# Patient Record
Sex: FEMALE | Race: AMERICAN INDIAN OR ALASKA NATIVE
[De-identification: names, ages, dates, MRNs, and addresses within clinical notes are randomized per-mention and may not be internally consistent; named-entity substitution may affect disease eponyms.]

---

## 2018-01-27 ENCOUNTER — HOSPITAL ENCOUNTER (EMERGENCY)
Dept: HOSPITAL 42 - ED | Age: 23
Discharge: HOME | End: 2018-01-27
Payer: COMMERCIAL

## 2018-01-27 VITALS — HEART RATE: 68 BPM | DIASTOLIC BLOOD PRESSURE: 76 MMHG | SYSTOLIC BLOOD PRESSURE: 118 MMHG | RESPIRATION RATE: 18 BRPM

## 2018-01-27 VITALS — BODY MASS INDEX: 24.2 KG/M2

## 2018-01-27 VITALS — TEMPERATURE: 99.2 F | OXYGEN SATURATION: 100 %

## 2018-01-27 DIAGNOSIS — A59.9: ICD-10-CM

## 2018-01-27 DIAGNOSIS — N83.202: ICD-10-CM

## 2018-01-27 DIAGNOSIS — N83.201: ICD-10-CM

## 2018-01-27 DIAGNOSIS — N94.6: ICD-10-CM

## 2018-01-27 DIAGNOSIS — N39.0: Primary | ICD-10-CM

## 2018-01-27 LAB
ALBUMIN SERPL-MCNC: 3.9 G/DL (ref 3–4.8)
ALBUMIN/GLOB SERPL: 1.4 {RATIO} (ref 1.1–1.8)
ALT SERPL-CCNC: 25 U/L (ref 7–56)
AMORPH SED URNS QL MICRO: (no result)
APPEARANCE UR: CLEAR
APTT BLD: 34.9 SECONDS (ref 25.1–36.5)
AST SERPL-CCNC: 17 U/L (ref 14–36)
BACTERIA #/AREA URNS HPF: (no result) /[HPF]
BASOPHILS # BLD AUTO: 0.07 K/MM3 (ref 0–2)
BASOPHILS NFR BLD: 1.3 % (ref 0–3)
BILIRUB UR-MCNC: NEGATIVE MG/DL
BUN SERPL-MCNC: 12 MG/DL (ref 7–21)
CALCIUM SERPL-MCNC: 9 MG/DL (ref 8.4–10.5)
COLOR UR: YELLOW
EOSINOPHIL # BLD: 0.3 10*3/UL (ref 0–0.7)
EOSINOPHIL NFR BLD: 4.7 % (ref 1.5–5)
ERYTHROCYTE [DISTWIDTH] IN BLOOD BY AUTOMATED COUNT: 13.4 % (ref 11.5–14.5)
GFR NON-AFRICAN AMERICAN: > 60
GLUCOSE UR STRIP-MCNC: NEGATIVE MG/DL
GRANULOCYTES # BLD: 2.86 10*3/UL (ref 1.4–6.5)
GRANULOCYTES NFR BLD: 51.4 % (ref 50–68)
HCG,QUALITATIVE URINE: NEGATIVE
HGB BLD-MCNC: 12.2 G/DL (ref 12–16)
INR PPP: 1.19 (ref 0.93–1.08)
LEUKOCYTE ESTERASE UR-ACNC: (no result) LEU/UL
LYMPHOCYTES # BLD: 2 10*3/UL (ref 1.2–3.4)
LYMPHOCYTES NFR BLD AUTO: 36.8 % (ref 22–35)
MCH RBC QN AUTO: 28.8 PG (ref 25–35)
MCHC RBC AUTO-ENTMCNC: 33 G/DL (ref 31–37)
MCV RBC AUTO: 87.3 FL (ref 80–105)
MONOCYTES # BLD AUTO: 0.3 10*3/UL (ref 0.1–0.6)
MONOCYTES NFR BLD: 5.8 % (ref 1–6)
PH UR STRIP: 6 [PH] (ref 4.7–8)
PLATELET # BLD: 228 10^3/UL (ref 120–450)
PMV BLD AUTO: 11.6 FL (ref 7–11)
PROT UR STRIP-MCNC: NEGATIVE MG/DL
PROTHROMBIN TIME: 13.6 SECONDS (ref 9.4–12.5)
RBC # BLD AUTO: 4.24 10^6/UL (ref 3.5–6.1)
RBC # UR STRIP: NEGATIVE /UL
RBC #/AREA URNS HPF: (no result) /HPF (ref 0–2)
SP GR UR STRIP: 1.02 (ref 1–1.03)
URINE NITRATE: POSITIVE
UROBILINOGEN UR STRIP-ACNC: 1 E.U./DL
WBC # BLD AUTO: 5.6 10^3/UL (ref 4.5–11)

## 2018-01-27 NOTE — US
EXAM:

  US Pelvis Complete, Transabdominal



CLINICAL HISTORY:

  22 years old, female; Pain; Pelvic pain; Additional info: Lower abdominal 

pain with bleeding, neg preg



TECHNIQUE:

  Real-time transabdominal pelvic ultrasound (complete) with image 

documentation.



COMPARISON:

  CT - ABD   PELVIS W/O PO OR IV CONT 2017-01-04 22:45



FINDINGS:

  Uterus/cervix:  Uterus measures 7.6 x 4.2 x 6.7 cm in size.  No myometrial 

mass.  

  Endometrium:  0.8 cm in thickness.

  Right ovary:  3.4 x 2.7 x 2.1 cm in size.  1.6 x 1.4 x 1.6 cm anechoic 

lesion.  Small follicles.  Normal flow.

  Left ovary:  3.1 x 2.2 x 3.0 cm in size.  1.3 x 1.6 x 1.3 cm anechoic lesion. 

 Small follicles.  Normal flow.

  Free fluid:  No significant free fluid.

  Bladder:  Unremarkable as visualized.

 

IMPRESSION:     

1.  RIGHT ovarian cyst.

2.  LEFT ovarian cyst.



_______________________________________________



EXAM:

  US Pelvis, Transvaginal



CLINICAL HISTORY:

  22 years old, female; Pain; Pelvic pain; Additional info: Lower abdominal 

pain with bleeding, neg preg



TECHNIQUE:

  Real-time transvaginal pelvic ultrasound (complete) with image documentation. 

 Transvaginal imaging was used for better evaluation of the endometrium and 

adnexa.



COMPARISON:

  CT - ABD   PELVIS W/O PO OR IV CONT 2017-01-04 22:45



FINDINGS:

  Uterus/cervix:  Uterus measures 7.6 x 4.2 x 6.7 cm in size.  No myometrial 

mass.

  Endometrium:  0.8 cm in thickness.

  Right ovary:  3.4 x 2.7 x 2.1 cm in size.  1.6 x 1.4 x 1.6 cm anechoic 

lesion.  Small follicles.  Normal flow.

  Left ovary:  3.1 x 2.2 x 3.0 cm in size.  1.3 x 1.6 x 1.3 cm anechoic lesion. 

 Small follicles.  Normal flow.

  Free fluid:  No significant free fluid.

  Bladder:  Empty bladder which cannot be evaluated with this probe.



IMPRESSION:     

1.  RIGHT ovarian cyst.

2.  LEFT ovarian cyst.

## 2018-01-27 NOTE — ED PDOC
Arrival/HPI





- General


Chief Complaint: Abdominal Pain


Time Seen by Provider: 01/27/18 15:52


Historian: Patient





- History of Present Illness


Narrative History of Present Illness (Text): 





01/27/18 16:13


Patient is a 21 yo female, LMP 6 days ago, presents to Emergency Department 

complaining of bilateral lower abdominal pain, right greater than left, for 

past 2-3 days. Patient also reportedly has had vaginal spotting yesterday and 

today, with "clot of tissue" when she urinated yesterday. Denies dysuria or 

frequency. Denies nausea vomiting or diarrhea. States that her last menstrual 

period ended 6 days ago and was heavier than usual with larger clots than 

usual. She states that she is sexually active but states she believes that she 

is not pregnant. Denies back pain. Denies chest pain or shortness of breath.





Past Medical History





- Past History


Past History: No Previous





- Infectious Disease


Hx of Infectious Diseases: None





- Tetanus Immunization


Tetanus Immunization: Up to Date





- Cardiac


Hx Cardiac Disorders: No





- Pulmonary


Hx Respiratory Disorders: No





- Neurological


Hx Neurological Disorder: Yes


Hx Migraine: Yes





- HEENT


Hx HEENT Disorder: No





- Renal


Hx Renal Disorder: No





- Endocrine/Metabolic


Hx Endocrine Disorders: No





- Hematological/Oncological


Hx Blood Disorders: No





- Integumentary


Hx Dermatological Disorder: No





- Musculoskeletal/Rheumatological


Hx Musculoskeletal Disorders: No





- Gastrointestinal


Hx Gastrointestinal Disorders: No





- Genitourinary/Gynecological


Hx Genitourinary Disorders: No





- Psychiatric


Hx Psychophysiologic Disorder: No


Hx Substance Use: No





Family/Social History


Family/Social History: Unknown Family HX


Smoking Status: Never Smoked


Hx Alcohol Use: Yes


Hx Substance Use: No





Allergies/Home Meds


Allergies/Adverse Reactions: 


Allergies





No Known Allergies Allergy (Verified 08/29/16 23:13)


 











Review of Systems





- Review of Systems


Constitutional: absent: Fevers


Eyes: absent: Vision Changes


ENT: absent: Hearing Changes


Respiratory: absent: SOB


Cardiovascular: absent: Chest Pain


Gastrointestinal: Abdominal Pain.  absent: Constipation, Diarrhea, Hematochezia

, Hematemesis, Anorexia, Food Intolerance


Genitourinary Female: Vaginal Bleeding.  absent: Dysuria, Frequency, Hematuria, 

Urine Output Changes





Physical Exam


Vital Signs Reviewed: Yes


Vital Signs











  Temp Pulse Resp BP Pulse Ox


 


 01/27/18 20:24   68  18  118/76  100


 


 01/27/18 14:44  99.2 F  73  16  106/73  100











Temperature: Afebrile


Appearance: Positive for: Non-Toxic


Pain Distress: Mild


Mental Status: Positive for: Alert and Oriented X 3





- Systems Exam


Head: Present: Atraumatic


Pupils: Present: PERRL


Mouth: Present: Moist Mucous Membranes


Pharnyx: No: ERYTHEMA


Nose (Internal): Present: Normal Inspection


Neck: Present: Normal Range of Motion.  No: Meningeal Signs


Respiratory/Chest: Present: Clear to Auscultation.  No: Respiratory Distress


Cardiovascular: Present: Regular Rate and Rhythm


Abdomen: Present: Tenderness (mild suprapubic, mild rlq pain).  No: Peritoneal 

Signs, Rebound, Guarding, McBurney's Point Tender


Rectal: No: Gross Blood


Genitourinary/Pelvic Exam: Present: Other (pelvic exam performed with MATTEO Neves chaperone, no active bleeding, no external lesions or clots or erythema or 

edema, no adnexal masses palpated)


Back: No: CVA Tenderness, Midline Tenderness


Upper Extremity: Present: NORMAL PULSES.  No: Cyanosis, Edema


Lower Extremity: Present: NORMAL PULSES, Neurovascularly Intact


Skin: Present: Warm


Psychiatric: Present: Alert





Medical Decision Making


ED Course and Treatment: 





01/27/18 19:01


Patient reports heavier menstrual period last week. Bleeding stopped than 

spotting started yesterday. Currently mild lower abdominal pain. Denies urinary 

symptoms. CBC obtained due to hx of heavier bleeding, hgb unremarkable. Patient 

not tachycardic or hypotensive. No fever, no vomiting or diarrhea.


UA pregnancy negative. 





Ultrasound ordered due to lower abdominal pain with bleeding.


Case endorsed to Dr. Ratliff pending urinalysis evaluation and ultrasound.





- Lab Interpretations


Microbiology Results: 


Microbiology Results





01/27/18 21:00   Urine,Clean Catch   Urine Culture - Final


                            Escherichia Coli








Lab Results: 








 01/27/18 16:16 





 01/27/18 16:16 





 Lab Results





01/27/18 19:52: Urine Color Yellow, Urine Appearance Clear, Urine pH 6.0, Ur 

Specific Gravity 1.025, Urine Protein Negative, Urine Glucose (UA) Negative, 

Urine Ketones Trace H, Urine Blood Negative, Urine Nitrate Positive H, Urine 

Bilirubin Negative, Urine Urobilinogen 1.0 H, Ur Leukocyte Esterase Trace H, 

Urine RBC 0 - 2, Urine WBC 2 - 5, Ur Epithelial Cells 6 - 8, Amorphous Sediment 

Few, Urine Bacteria Many, Urine Other Trichomonas, Urine HCG, Qual Negative


01/27/18 16:16: Beta HCG, Quant < 2.39


01/27/18 16:16: Sodium 138, Potassium 3.7, Chloride 106, Carbon Dioxide 24, 

Anion Gap 11, BUN 12, Creatinine 0.6 L, Est GFR ( Amer) > 60, Est GFR (

Non-Af Amer) > 60, Random Glucose 81, Calcium 9.0, Total Bilirubin 0.4, AST 17, 

ALT 25, Alkaline Phosphatase 58, Total Protein 6.6, Albumin 3.9, Globulin 2.8, 

Albumin/Globulin Ratio 1.4


01/27/18 16:16: PT 13.6 H, INR 1.19 H, APTT 34.9


01/27/18 16:16: WBC 5.6  D, RBC 4.24, Hgb 12.2, Hct 37.0, MCV 87.3, MCH 28.8, 

MCHC 33.0, RDW 13.4, Plt Count 228, MPV 11.6 H, Gran % 51.4, Lymph % (Auto) 

36.8 H, Mono % (Auto) 5.8, Eos % (Auto) 4.7, Baso % (Auto) 1.3, Gran # 2.86, 

Lymph # 2.0, Mono # 0.3, Eos # 0.3, Baso # 0.07











- RAD Interpretation


Radiology Orders: 








01/27/18 16:08


TRANSVAGINAL [US] Stat 














- Medication Orders


Current Medication Orders: 











Discontinued Medications





Cephalexin Monohydrate (Keflex)  500 mg PO STAT ONE


   PRN Reason: Protocol


   Stop: 01/27/18 21:16


   Last Admin: 01/27/18 21:17  Dose: 500 mg





Metronidazole (Flagyl)  2,000 mg PO STAT STA


   PRN Reason: Protocol


   Stop: 01/27/18 20:58


   Last Admin: 01/27/18 21:16  Dose: 2,000 mg











Disposition/Present on Arrival





- Present on Arrival


Any Indicators Present on Arrival: No


History of DVT/PE: No


History of Uncontrolled Diabetes: No


Urinary Catheter: No


History of Decub. Ulcer: No


History Surgical Site Infection Following: None





- Disposition


Have Diagnosis and Disposition been Completed?: Yes


Diagnosis: 


 Ovarian cyst, Dysmenorrhea, UTI (urinary tract infection), Trichomoniasis





Disposition: HOME/ ROUTINE


Disposition Time: 19:00


Patient Plan: Observation


Condition: FAIR


Discharge Instructions (ExitCare):  Dysmenorrhea (ED), Ovarian Cyst (ED), 

Trichomoniasis (ED), Urinary Tract Infection in Women (ED)


Additional Instructions: 


Drink plenty of liquids/take meds as prescribed/advil as directed/follow up 

with your gynecologist this week


Prescriptions: 


Cephalexin [cephalexin] 500 mg PO BID #14 cap


Forms:  [employer] (English)

## 2018-01-27 NOTE — ED PDOC
Physical Exam


Vital Signs Reviewed: Yes


Vital Signs











  Temp Pulse Resp BP Pulse Ox


 


 01/27/18 20:24   68  18  118/76  100


 


 01/27/18 14:44  99.2 F  73  16  106/73  100











Temperature: Afebrile


Blood Pressure: Normal


Pulse: Regular


Respiratory Rate: Normal


Appearance: Positive for: Well-Appearing, Non-Toxic, Comfortable


Pain Distress: None


Mental Status: Positive for: Alert and Oriented X 3





Medical Decision Making


ED Course and Treatment: 





01/27/18 19:21: Patient endorsed to me by Dr. Hanks. Pending Transvaginal 

Ultrasound results. Will re-evaluate and disposition.








- Lab Interpretations


Lab Results: 








 01/27/18 16:16 





 01/27/18 16:16 





 Lab Results





01/27/18 19:52: Urine Color Yellow, Urine Appearance Clear, Urine pH 6.0, Ur 

Specific Gravity 1.025, Urine Protein Negative, Urine Glucose (UA) Negative, 

Urine Ketones Trace H, Urine Blood Negative, Urine Nitrate Positive H, Urine 

Bilirubin Negative, Urine Urobilinogen 1.0 H, Ur Leukocyte Esterase Trace H, 

Urine RBC 0 - 2, Urine WBC 2 - 5, Ur Epithelial Cells 6 - 8, Amorphous Sediment 

Few, Urine Bacteria Many, Urine Other Trichomonas, Urine HCG, Qual Negative


01/27/18 16:16: Beta HCG, Quant < 2.39


01/27/18 16:16: Sodium 138, Potassium 3.7, Chloride 106, Carbon Dioxide 24, 

Anion Gap 11, BUN 12, Creatinine 0.6 L, Est GFR ( Amer) > 60, Est GFR (

Non-Af Amer) > 60, Random Glucose 81, Calcium 9.0, Total Bilirubin 0.4, AST 17, 

ALT 25, Alkaline Phosphatase 58, Total Protein 6.6, Albumin 3.9, Globulin 2.8, 

Albumin/Globulin Ratio 1.4


01/27/18 16:16: PT 13.6 H, INR 1.19 H, APTT 34.9


01/27/18 16:16: WBC 5.6  D, RBC 4.24, Hgb 12.2, Hct 37.0, MCV 87.3, MCH 28.8, 

MCHC 33.0, RDW 13.4, Plt Count 228, MPV 11.6 H, Gran % 51.4, Lymph % (Auto) 

36.8 H, Mono % (Auto) 5.8, Eos % (Auto) 4.7, Baso % (Auto) 1.3, Gran # 2.86, 

Lymph # 2.0, Mono # 0.3, Eos # 0.3, Baso # 0.07











- RAD Interpretation


Radiology Orders: 








01/27/18 16:08


TRANSVAGINAL [US] Stat 














- Scribe Statement


The provider has reviewed the documentation as recorded by the Scribe


Lesly Suarez 





Provider Scribe Attestation:


All medical record entries made by the Scribe were at my direction and 

personally dictated by me. I have reviewed the chart and agree that the record 

accurately reflects my personal performance of the history, physical exam, 

medical decision making, and the department course for this patient. I have 

also personally directed, reviewed, and agree with the discharge instructions 

and disposition.








Disposition/Present on Arrival





- Present on Arrival


Any Indicators Present on Arrival: No


History of DVT/PE: No


History of Uncontrolled Diabetes: No


Urinary Catheter: No


History of Decub. Ulcer: No


History Surgical Site Infection Following: None





- Disposition


Have Diagnosis and Disposition been Completed?: Yes


Diagnosis: 


 Ovarian cyst, Dysmenorrhea, UTI (urinary tract infection), Trichomoniasis





Disposition: HOME/ ROUTINE


Disposition Time: 20:54


Patient Plan: Discharge


Patient Problems: 


 Current Active Problems











Problem Status Onset


 


Dysmenorrhea Acute  


 


Ovarian cyst Acute  











Condition: FAIR


Discharge Instructions (ExitCare):  Ovarian Cyst (ED), Dysmenorrhea (ED), 

Urinary Tract Infection in Women (ED), Trichomoniasis (ED)


Additional Instructions: 


Drink plenty of liquids/take meds as prescribed/advil as directed/follow up 

with your gynecologist this week


Prescriptions: 


Cephalexin [cephalexin] 500 mg PO BID #14 cap


Forms:  [employer] (English)

## 2018-04-26 ENCOUNTER — HOSPITAL ENCOUNTER (EMERGENCY)
Dept: HOSPITAL 31 - C.ER | Age: 23
Discharge: HOME | End: 2018-04-26
Payer: SELF-PAY

## 2018-04-26 VITALS — SYSTOLIC BLOOD PRESSURE: 120 MMHG | DIASTOLIC BLOOD PRESSURE: 65 MMHG | OXYGEN SATURATION: 99 % | HEART RATE: 76 BPM

## 2018-04-26 VITALS — BODY MASS INDEX: 24.2 KG/M2

## 2018-04-26 VITALS — TEMPERATURE: 98.9 F | RESPIRATION RATE: 20 BRPM

## 2018-04-26 DIAGNOSIS — R10.9: ICD-10-CM

## 2018-04-26 DIAGNOSIS — N83.201: Primary | ICD-10-CM

## 2018-04-26 LAB
ALBUMIN SERPL-MCNC: 3.9 [, G/DL] (ref 3.5–5)
ALBUMIN/GLOB SERPL: 1.3 [,] (ref 1–2.1)
ALT SERPL-CCNC: 14 [, U/L] (ref 9–52)
AST SERPL-CCNC: 14 [, U/L] (ref 14–36)
BACTERIA #/AREA URNS HPF: (no result) [,]
BASOPHILS # BLD AUTO: 0.1 [, K/UL] (ref 0–0.2)
BASOPHILS NFR BLD: 1 [, %] (ref 0–2)
BILIRUB UR-MCNC: NEGATIVE [,]
BUN SERPL-MCNC: 15 [, MG/DL] (ref 7–17)
CALCIUM SERPL-MCNC: 8.9 [, MG/DL] (ref 8.6–10.4)
EOSINOPHIL # BLD AUTO: 0.5 [, K/UL] (ref 0–0.7)
EOSINOPHIL NFR BLD: 5.6 [, %] (ref 0–4)
ERYTHROCYTE [DISTWIDTH] IN BLOOD BY AUTOMATED COUNT: 13.9 [, %] (ref 11.5–14.5)
GFR NON-AFRICAN AMERICAN: > 60 [,]
GLUCOSE UR STRIP-MCNC: NORMAL [, MG/DL]
HCG,QUALITATIVE URINE: NEGATIVE [,]
HGB BLD-MCNC: 12.2 [, G/DL] (ref 11–16)
LEUKOCYTE ESTERASE UR-ACNC: (no result) [, LEU/UL]
LIPASE: 94 [, U/L] (ref 23–300)
LYMPHOCYTES # BLD AUTO: 2.8 [, K/UL] (ref 1–4.3)
LYMPHOCYTES NFR BLD AUTO: 33.8 [, %] (ref 20–40)
MCH RBC QN AUTO: 29.2 [, PG] (ref 27–31)
MCHC RBC AUTO-ENTMCNC: 33.7 [, G/DL] (ref 33–37)
MCV RBC AUTO: 86.5 [, FL] (ref 81–99)
MONOCYTES # BLD: 0.7 [, K/UL] (ref 0–0.8)
MONOCYTES NFR BLD: 8.2 [, %] (ref 0–10)
NEUTROPHILS # BLD: 4.3 [, K/UL] (ref 1.8–7)
NEUTROPHILS NFR BLD AUTO: 51.4 [, %] (ref 50–75)
NRBC BLD AUTO-RTO: 0 [, %] (ref 0–2)
PH UR STRIP: 7 [,] (ref 5–8)
PLATELET # BLD: 205 [, K/UL] (ref 130–400)
PMV BLD AUTO: 9.6 [, FL] (ref 7.2–11.7)
PROT UR STRIP-MCNC: NEGATIVE [, MG/DL]
RBC # BLD AUTO: 4.2 [, MIL/UL] (ref 3.8–5.2)
RBC # UR STRIP: NEGATIVE [,]
SP GR UR STRIP: 1.03 [,] (ref 1–1.03)
SQUAMOUS EPITHIAL: 6 [, /HPF] (ref 0–5)
UROBILINOGEN UR-MCNC: 4 [, MG/DL] (ref 0.2–1)
WBC # BLD AUTO: 8.4 [, K/UL] (ref 4.8–10.8)

## 2018-04-26 PROCEDURE — 99284 EMERGENCY DEPT VISIT MOD MDM: CPT

## 2018-04-26 PROCEDURE — 74177 CT ABD & PELVIS W/CONTRAST: CPT

## 2018-04-26 PROCEDURE — 84702 CHORIONIC GONADOTROPIN TEST: CPT

## 2018-04-26 PROCEDURE — 81001 URINALYSIS AUTO W/SCOPE: CPT

## 2018-04-26 PROCEDURE — 85025 COMPLETE CBC W/AUTO DIFF WBC: CPT

## 2018-04-26 PROCEDURE — 83690 ASSAY OF LIPASE: CPT

## 2018-04-26 PROCEDURE — 80053 COMPREHEN METABOLIC PANEL: CPT

## 2018-04-26 PROCEDURE — 96360 HYDRATION IV INFUSION INIT: CPT

## 2018-04-26 PROCEDURE — 84703 CHORIONIC GONADOTROPIN ASSAY: CPT

## 2018-04-26 NOTE — CT
EXAM:

  CT Abdomen and Pelvis With Intravenous Contrast



EXAM DATE/TIME:

  4/26/2018 7:49 PM



CLINICAL HISTORY:

  22 years old, female; Pain and signs and symptoms; Bloating; Abdominal pain; 

Generalized; Additional info: Abdominla pain and distension



TECHNIQUE:

  Axial computed tomography images of the abdomen and pelvis with intravenous 

contrast.  All CT scans at this facility use one or more dose reduction 

techniques, viz.: automated exposure control; ma/kV adjustment per patient size 

(including targeted exams where dose is matched to indication; i.e. head); or 

iterative reconstruction technique.

  Coronal and sagittal reformatted images were created and reviewed.



CONTRAST:

  100 mL of visipaque 320 administered intravenously.



COMPARISON:

  No relevant prior studies available.



FINDINGS:

  LUNG BASES:  No significant abnormality seen.



 ABDOMEN:

  LIVER:  No acute abnormality of the liver identified.

  GALLBLADDER AND BILE DUCTS:  No CT evidence of acute cholecystitis.  No 

evidence of significant biliary ductal dilatation.

  PANCREAS:  No CT evidence of acute pancreatitis.

  SPLEEN:  No acute abnormality of the spleen identified.

  ADRENALS:  No acute abnormality of the adrenal glands identified.

  KIDNEYS AND URETERS:  No acute abnormality of the kidneys identified. 

  STOMACH AND BOWEL:  No acute abnormality of the stomach, small bowel or colon 

identified. No evidence of bowel obstruction.



 PELVIS:

  APPENDIX:  Appendix is seen, and is within normal limits in appearance.

  BLADDER:  No acute abnormality of the bladder identified.

  REPRODUCTIVE:  Multiple prominent vessels in the adnexal regions bilaterally. 

This is a a nonspecific finding, but can be seen with pelvic congestion 

syndrome. Recommend clinical correlation.  Small 1.4 cm lesion with a thin, 

enhancing and collapsed soft tissue rim in the right ovary.  This has an 

appearance suggestive of a recently ruptured/involuting ovarian cyst, such as a 

corpus luteal cyst.  Followup pelvic ultrasound as clinically indicated.  No 

evidence of large left adnexal cystic masses.  No acute abnormality of the 

uterus identified.



 ABDOMEN and PELVIS:

  INTRAPERITONEAL SPACE:  Small amount of free fluid in the pelvis. This is 

most likely physiologic in nature.  No evidence of free air.

  BONES/JOINTS:  No acute fractures or other acute bony abnormality noted.

  SOFT TISSUES:  No acute abnormality of the visualized soft tissues is seen.

  VASCULATURE:   No evidence of aortic dissection. 

  LYMPH NODES:  No evidence of diffuse lymphadenopathy.



IMPRESSION:     

- No evidence of significant acute process.

- Small amount of pelvic free fluid, most likely physiologic in nature.

- See above for remaining findings.

## 2018-04-26 NOTE — C.PDOC
History Of Present Illness


Patient presents to the ER with a complaint of abdominal distention and diffuse 

cramping pain since yesterday. Denies fever, chills, nausea, or vomiting.


Time Seen by Provider: 04/26/18 19:12


Chief Complaint (Nursing): Abdominal Pain


History Per: Patient


History/Exam Limitations: no limitations


Onset/Duration Of Symptoms: Days


Current Symptoms Are (Timing): Still Present


Severity: Moderate


Pain Scale Rating Of: 4


Location Of Pain/Discomfort: Diffuse


Radiation Of Pain To:: None


Quality Of Discomfort: Cramping


Associated Symptoms: denies: Fever, Chills, Nausea, Vomiting


Exacerbating Factors: None


Alleviating Factors: None


Recent travel outside of the [place]: No


Abnormal Vaginal Bleeding: No





Past Medical History


Reviewed: Historical Data, Nursing Documentation, Vital Signs


Vital Signs: 


 Last Vital Signs











Temp  98.9 F   04/26/18 18:51


 


Pulse  78   04/26/18 18:51


 


Resp  20   04/26/18 18:51


 


BP  117/70   04/26/18 18:51


 


Pulse Ox  100   04/26/18 19:51














- Medical History


PMH: Migraine


Family History: States: No Known Family Hx





- Social History


Hx Alcohol Use: Yes


Hx Substance Use: No





- Immunization History


Hx Tetanus Toxoid Vaccination: No


Hx Influenza Vaccination: No


Hx Pneumococcal Vaccination: No





Review Of Systems


Constitutional: Negative for: Fever, Chills


Respiratory: Negative for: Cough


Gastrointestinal: Positive for: Abdominal Pain, Other (Abdominal distention).  

Negative for: Nausea, Vomiting





Physical Exam





- Physical Exam


Appears: Non-toxic


Skin: Warm, Dry


Head: Normacephalic


Oral Mucosa: Moist


Chest: Symmetrical, No Tenderness


Cardiovascular: Rhythm Regular


Respiratory: No Rales, No Rhonchi, No Wheezing


Gastrointestinal/Abdominal: Soft, No Tenderness, Distention, No Guarding, No 

Rebound, Other (Tympanic to percussion)


Neurological/Psych: Oriented x3





ED Course And Treatment





- Laboratory Results


Result Diagrams: 


 04/26/18 19:37





 04/26/18 19:37


O2 Sat by Pulse Oximetry: 100 (room air)


Pulse Ox Interpretation: Normal


Progress Note: Blood work and urinalysis ordered. Pepcid and IV fluids 

administered.


Reevaluation Time: 22:57


Reassessment Condition: Improved





Medical Decision Making


Medical Decision Making: 





Upon provider reevaluation patient is feeling better, is medically stable, and 

requires no further treatment in the ED at this time. Patient will be 

discharged home. Counseling was provided and all questions were answered 

regarding diagnosis and need for follow up with dr moore. There is agreement to 

discharge plan. Return if symptoms persist or worsen.





Disposition


Counseled Patient/Family Regarding: Studies Performed, Diagnosis, Need For 

Followup





- Disposition


Referrals: 


Tim Moore MD [Staff Provider] - 


Disposition: HOME/ ROUTINE


Disposition Time: 19:12


Condition: FAIR


Additional Instructions: 


Please return if symptoms recur.


Instructions:  Ovarian Cyst (DC)


Forms:  [facility] (English)





- Clinical Impression


Clinical Impression: 


 Abdominal pain, Ovarian cyst








- Scribe Statement


The provider has reviewed the documentation as recorded by the Scribe


Horace Senior





All medical record entries made by the Scribe were at my direction and 

personally dictated by me. I have reviewed the chart and agree that the record 

accurately reflects my personal performance of the history, physical exam, 

medical decision making, and the department course for this patient. I have 

also personally directed, reviewed, and agree with the discharge instructions 

and disposition.